# Patient Record
Sex: MALE | Race: WHITE | URBAN - METROPOLITAN AREA
[De-identification: names, ages, dates, MRNs, and addresses within clinical notes are randomized per-mention and may not be internally consistent; named-entity substitution may affect disease eponyms.]

---

## 2017-06-12 ENCOUNTER — THERAPY VISIT (OUTPATIENT)
Dept: PHYSICAL THERAPY | Facility: CLINIC | Age: 82
End: 2017-06-12
Payer: MEDICARE

## 2017-06-12 DIAGNOSIS — M54.2 CERVICALGIA: Primary | ICD-10-CM

## 2017-06-12 PROCEDURE — G8985 CARRY GOAL STATUS: HCPCS | Mod: GP | Performed by: PHYSICAL THERAPIST

## 2017-06-12 PROCEDURE — 97162 PT EVAL MOD COMPLEX 30 MIN: CPT | Mod: GP | Performed by: PHYSICAL THERAPIST

## 2017-06-12 PROCEDURE — 97112 NEUROMUSCULAR REEDUCATION: CPT | Mod: GP | Performed by: PHYSICAL THERAPIST

## 2017-06-12 PROCEDURE — G8984 CARRY CURRENT STATUS: HCPCS | Mod: GP | Performed by: PHYSICAL THERAPIST

## 2017-06-12 PROCEDURE — 97140 MANUAL THERAPY 1/> REGIONS: CPT | Mod: GP | Performed by: PHYSICAL THERAPIST

## 2017-06-12 NOTE — LETTER
DEPARTMENT OF HEALTH AND HUMAN SERVICES  CENTERS FOR MEDICARE & MEDICAID SERVICES    PLAN/UPDATED PLAN OF PROGRESS FOR OUTPATIENT REHABILITATION    PATIENTS NAME:  Flakito Low   : 1932    PROVIDER NUMBER:    [id number]  [id number]    PROVIDER NAME: [facility] PH[facility]ICAL THERAP[facility]  MEDICAL RECORD NUMBER: [id number]     START OF CARE DATE:  SOC Date: 17   TYPE:  PT    PRIMARY/TREATMENT DIAGNOSIS: (Pertinent Medical Diagnosis)  Cervicalgia    VISITS FROM START OF CARE:  1     Physical Therapy Initial Examination/Evaluation    Precautions/Restrictions/MD instructions  Chronic neck pain. ET    Therapist Impression:   Flakito is an 84 yr old male with chronic neck pain.  He reports it has gotten worse for unknown reasons as of late.  Used to manage with advil for years but developed ulcer, now uses tylenol.  Somewhat effective.  Is unsure what makes pain better for worse.  Poor FHP.  Poor scapular retractor mm recruitment with UT substitution.     Subjective:  DOI/onset: MD appt date 2017.  Chronic.   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, yard,reading  Current HEP/exercise regimen:   Patient's goals are decrease pain with ADLs        PATIENTS NAME:  Flakito Low   : 1932  PRIMARY/TREATMENT DIAGNOSIS: (Pertinent Medical Diagnosis)  Cervicalgia    Other pertinent PMH/Red Flags: Diabetes, RA, night pain, cholostomy  Barriers  at home/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  Pertinent Surgical History: 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  Thoracic AROM: not assessed  Strength: Poor scapular mm recruitment with UT hike substitution  Headaches cervical eval: occiput and L side neck/jaw pain.  Cervical Myotomes:  Cervical myotomes: no neurological weakness noted.  DTR's:  not assessed  Cervical Dermatomes:  not assessed (no numbness/tingling reported)  Cervical Stability/Joint Clearing:    Left negative at: TLA LAT or VAT  Right negative at:  TLA LAT or VAT  Negative:ALAR Ligament and TLA AP  Cord Sign:  not assessed    Assessment/Plan:    Patient is a 84 year old male with cervical complaints.    Patient has the following significant findings with corresponding treatment plan.                Diagnosis 1:  Chronic neck pain  Pain -  hot/cold therapy, manual therapy, self management, education, directional preference exercise and home program  Decreased ROM/flexibility - manual therapy and therapeutic exercise  Decreased joint mobility - manual therapy and therapeutic exercise  Decreased strength - therapeutic exercise and therapeutic activities  Decreased proprioception - neuro re-education and therapeutic activities  Impaired muscle performance - neuro re-education  Decreased function - therapeutic activities  Impaired posture - neuro re-education                PATIENTS NAME:  Flakito Low   :  1932  PRIMARY/TREATMENT DIAGNOSIS: (Pertinent Medical Diagnosis)  Cervicalgia    Therapy Evaluation Codes:   1) History comprised of:   Personal factors that impact the plan of care:      Age.    Comorbidity factors that impact the plan of care are:      Diabetes, High blood pressure and Rheumatoid arthritis.     Medications impacting care: Heparin/coumadin and Pain.  2) Examination of Body Systems comprised of:   Body structures and functions that impact the plan of care:      Cervical spine.   Activity limitations that impact the plan of care are:      Driving and Reading/Computer work.  3) Clinical presentation characteristics are:   Evolving/Changing.  4) Decision-Making    Moderate complexity using standardized patient assessment instrument and/or measureable assessment of functional outcome.  Cumulative Therapy Evaluation is: Moderate complexity.    Previous and current functional limitations:  (See Goal Flow Sheet for this information)    Short term and Long term goals: (See Goal Flow Sheet for this information)   Communication ability:  Patient appears to be able to clearly communicate and understand verbal and written communication and follow directions correctly.  Treatment Explanation - The following has been discussed with the patient:   RX ordered/plan of care, Anticipated outcomes, Possible risks and side effects                                          PATIENTS NAME:  Flakito Low   : 1932  PRIMARY/TREATMENT DIAGNOSIS: (Pertinent Medical Diagnosis)  Cervicalgia      This patient would benefit from PT intervention to resume normal activities.   Rehab potential is fair.  Frequency:  1 X week, once daily  Duration:  for 6 weeks  Discharge Plan:  Achieve all LTG.  Independent in home treatment program.  Reach maximal therapeutic benefit.      Caregiver Signature/Credentials _____________________________ Date ________          Kalee Brenner DPT     I have reviewed and certified the need  "for these services and plan of treatment while under my care.        PHYSICIAN'S SIGNATURE:   _________________________________________    Date___________   Yuliana Gonsalez    Certification period:  Beginning of Cert date period: 06/12/17 to   09/09/17     Functional Level Progress Report: Please see attached \"Goal Flow sheet for Functional level.\"    ____X____ Continue Services or       ________ DC Services                Service dates: From  SOC Date: 06/12/17  to present                         "

## 2017-06-12 NOTE — PROGRESS NOTES
Physical Therapy Initial Examination/Evaluation    Precautions/Restrictions/MD instructions  Chronic neck pain. ET    Therapist Impression:   Flakito is an 84 yr old male with chronic neck pain.  He reports it has gotten worse for unknown reasons as of late.  Used to manage with advil for years but developed ulcer, now uses tylenol.  Somewhat effective.  Is unsure what makes pain better for worse.  Poor FHP.  Poor scapular retractor mm recruitment with UT substitution.     Subjective:  DOI/onset: MD appt date 8 June 2017.  Chronic. 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, yard,reading  Current HEP/exercise regimen:   Patient's goals are decrease pain with ADLs    Other pertinent PMH/Red Flags: Diabetes, RA, night pain, cholostomy  Barriers at home/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  Pertinent Surgical History: 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   Thoracic AROM: not assessed    Strength: Poor scapular mm recruitment with UT hike substitution  Headaches cervical eval: occiput and L side neck/jaw pain.  Cervical Myotomes:  Cervical myotomes: no neurological weakness noted.                  DTR's:  not assessed          Cervical Dermatomes:  not assessed (no numbness/tingling reported)                        Cervical Stability/Joint Clearing:      Left negative at: TLA LAT or VAT    Right negative at:  TLA LAT or VAT  Negative:ALAR Ligament and TLA AP    Cord Sign:  not assessed                                            General     ROS    Assessment/Plan:      Patient is a 84 year old male with cervical complaints.    Patient has the following significant findings with corresponding treatment plan.                Diagnosis 1:  Chronic neck pain  Pain -  hot/cold therapy, manual therapy, self management, education, directional preference exercise and home program  Decreased ROM/flexibility - manual therapy and therapeutic exercise  Decreased joint mobility - manual therapy and therapeutic exercise  Decreased strength - therapeutic exercise and therapeutic activities  Decreased proprioception - neuro re-education and therapeutic activities  Impaired muscle performance - neuro re-education  Decreased function - therapeutic activities  Impaired posture - neuro re-education    Therapy Evaluation Codes:   1) History comprised of:   Personal factors that impact the plan of care:      Age.    Comorbidity factors that impact the plan of care are:      Diabetes, High blood pressure and Rheumatoid arthritis.     Medications impacting care: Heparin/coumadin and Pain.  2) Examination of Body Systems comprised of:   Body structures and functions that impact the plan of care:      Cervical spine.   Activity limitations that impact the plan of care are:      Driving and Reading/Computer work.  3) Clinical presentation  characteristics are:   Evolving/Changing.  4) Decision-Making    Moderate complexity using standardized patient assessment instrument and/or measureable assessment of functional outcome.  Cumulative Therapy Evaluation is: Moderate complexity.    Previous and current functional limitations:  (See Goal Flow Sheet for this information)    Short term and Long term goals: (See Goal Flow Sheet for this information)     Communication ability:  Patient appears to be able to clearly communicate and understand verbal and written communication and follow directions correctly.  Treatment Explanation - The following has been discussed with the patient:   RX ordered/plan of care  Anticipated outcomes  Possible risks and side effects  This patient would benefit from PT intervention to resume normal activities.   Rehab potential is fair.    Frequency:  1 X week, once daily  Duration:  for 6 weeks  Discharge Plan:  Achieve all LTG.  Independent in home treatment program.  Reach maximal therapeutic benefit.    Please refer to the daily flowsheet for treatment today, total treatment time and time spent performing 1:1 timed codes.

## 2017-06-19 ENCOUNTER — THERAPY VISIT (OUTPATIENT)
Dept: PHYSICAL THERAPY | Facility: CLINIC | Age: 82
End: 2017-06-19
Payer: MEDICARE

## 2017-06-19 DIAGNOSIS — M54.2 CERVICALGIA: ICD-10-CM

## 2017-06-19 PROCEDURE — 97012 MECHANICAL TRACTION THERAPY: CPT | Mod: GP | Performed by: PHYSICAL THERAPIST

## 2017-06-19 PROCEDURE — 97112 NEUROMUSCULAR REEDUCATION: CPT | Mod: GP | Performed by: PHYSICAL THERAPIST

## 2017-06-19 PROCEDURE — 97140 MANUAL THERAPY 1/> REGIONS: CPT | Mod: GP | Performed by: PHYSICAL THERAPIST

## 2017-06-28 ENCOUNTER — THERAPY VISIT (OUTPATIENT)
Dept: PHYSICAL THERAPY | Facility: CLINIC | Age: 82
End: 2017-06-28
Payer: MEDICARE

## 2017-06-28 DIAGNOSIS — M54.2 CERVICALGIA: ICD-10-CM

## 2017-06-28 PROCEDURE — 97112 NEUROMUSCULAR REEDUCATION: CPT | Mod: GP | Performed by: PHYSICAL THERAPIST

## 2017-06-28 PROCEDURE — 97110 THERAPEUTIC EXERCISES: CPT | Mod: GP | Performed by: PHYSICAL THERAPIST

## 2017-07-26 ENCOUNTER — THERAPY VISIT (OUTPATIENT)
Dept: PHYSICAL THERAPY | Facility: CLINIC | Age: 82
End: 2017-07-26
Payer: MEDICARE

## 2017-07-26 DIAGNOSIS — M54.2 CERVICALGIA: ICD-10-CM

## 2017-07-26 PROCEDURE — 97112 NEUROMUSCULAR REEDUCATION: CPT | Mod: GP | Performed by: PHYSICAL THERAPIST

## 2017-07-26 PROCEDURE — 97110 THERAPEUTIC EXERCISES: CPT | Mod: GP | Performed by: PHYSICAL THERAPIST

## 2017-08-10 ENCOUNTER — THERAPY VISIT (OUTPATIENT)
Dept: PHYSICAL THERAPY | Facility: CLINIC | Age: 82
End: 2017-08-10
Payer: MEDICARE

## 2017-08-10 DIAGNOSIS — M54.2 CERVICALGIA: ICD-10-CM

## 2017-08-10 PROCEDURE — G8985 CARRY GOAL STATUS: HCPCS | Mod: GP | Performed by: PHYSICAL THERAPIST

## 2017-08-10 PROCEDURE — G8986 CARRY D/C STATUS: HCPCS | Mod: GP | Performed by: PHYSICAL THERAPIST

## 2017-08-10 PROCEDURE — 97110 THERAPEUTIC EXERCISES: CPT | Mod: GP | Performed by: PHYSICAL THERAPIST

## 2017-08-10 NOTE — LETTER
"[facility] PHYSICAL THERAPY  [address]   [place] [address]  [phone]    August 10, 2017    Re: Flakito Low   :   1932  MRN:  [id number]   REFERRING PHYSICIAN:   Yuliana Gonsalez    [facility] PHYSICAL THERAP[facility]    Date of Initial Evaluation:  2017  Visits:    5  Reason for Referral:  Cervicalgia    DISCHARGE REPORT:  Progress reporting period is from 17 to 8.10.17.       SUBJECTIVE  Subjective changes noted by patient:  Pt reports pain is about the same but is variable but primarily comes on when sitting.  Has not been using lumbar roll.  Is performing HEP \"often, when he thinks of it\".  Does state that HEP does relieve pain if noticing pain before doing exercise.  States he feels like he knows what to do with exercises to manage on his own.    Current Pain level: 0/10.   Initial Pain level: 6/10.   Changes in function:  Yes (See Goal flowsheet attached for changes in current functional level).  Adverse reaction to treatment or activity: None    OBJECTIVE  C/S AROM:  Flex WNL; Ext min-mod loss; Rotation mod loss (B).  Ext and (B) rotation ROM increase after repeated ext.     ASSESSMENT/PLAN  Updated problem list and treatment plan: Diagnosis 1:  Neck Pain  Pain -  self management, education, directional preference exercise and home program  Decreased ROM/flexibility - manual therapy, therapeutic exercise and home program  Decreased joint mobility - manual therapy, therapeutic exercise and home program  Decreased strength - therapeutic exercise, therapeutic activities and home program  Impaired muscle performance - neuro re-education and home program  Impaired posture - neuro re-education and home program  STG/LTGs have been met or progress has been made towards goals:  Yes (See Goal flow sheet completed today.)  Assessment of Progress: The patient's condition is improving.  Self Management Plans:  Patient has " been instructed in a home treatment program.  Patient  has been instructed in self management of symptoms.  I have re-evaluated this patient and find that the nature, scope, duration and intensity of the therapy is appropriate for the medical condition of the patient.  Flakito continues to require the following intervention to meet STG and LTG's:  PT intervention is no longer required to meet STG/LTG.        Re: Flakito Low   :   1932      Recommendations:  This patient is ready to be discharged from therapy and continue their home treatment program.    Thank you for your referral.    INQUIRIES        Therapist:   ISRAEL AraujoT, cert MDT  [facility] PH[facility]ICAL THERAP[facility]  [address] [facility] [address]  [facility] [address]  Phone: [phone]  Fax: [phone]

## 2017-08-10 NOTE — PROGRESS NOTES
"Subjective:    HPI                    Objective:    System    Physical Exam    General     ROS    Assessment/Plan:      DISCHARGE REPORT    Progress reporting period is from 6.12.17 to 8.10.17.       SUBJECTIVE  Subjective changes noted by patient:  Pt reports pain is about the same but is variable but primarily comes on when sitting.  Has not been using lumbar roll.  Is performing HEP \"often, when he thinks of it\".  Does state that HEP does relieve pain if noticing pain before doing exercise.  States he feels like he knows what to do with exercises to manage on his own.    Current Pain level: 0/10.     Initial Pain level: 6/10.   Changes in function:  Yes (See Goal flowsheet attached for changes in current functional level)  Adverse reaction to treatment or activity: None    OBJECTIVE  C/S AROM:  Flex WNL; Ext min-mod loss; Rotation mod loss (B).  Ext and (B) rotation ROM increase after repeated ext.     ASSESSMENT/PLAN  Updated problem list and treatment plan: Diagnosis 1:  Neck Pain    Pain -  self management, education, directional preference exercise and home program  Decreased ROM/flexibility - manual therapy, therapeutic exercise and home program  Decreased joint mobility - manual therapy, therapeutic exercise and home program  Decreased strength - therapeutic exercise, therapeutic activities and home program  Impaired muscle performance - neuro re-education and home program  Impaired posture - neuro re-education and home program  STG/LTGs have been met or progress has been made towards goals:  Yes (See Goal flow sheet completed today.)  Assessment of Progress: The patient's condition is improving.  Self Management Plans:  Patient has been instructed in a home treatment program.  Patient  has been instructed in self management of symptoms.  I have re-evaluated this patient and find that the nature, scope, duration and intensity of the therapy is appropriate for the medical condition of the patient.  Flakito " continues to require the following intervention to meet STG and LTG's:  PT intervention is no longer required to meet STG/LTG.    Recommendations:  This patient is ready to be discharged from therapy and continue their home treatment program.    Please refer to the daily flowsheet for treatment today, total treatment time and time spent performing 1:1 timed codes.